# Patient Record
Sex: MALE | Race: WHITE
[De-identification: names, ages, dates, MRNs, and addresses within clinical notes are randomized per-mention and may not be internally consistent; named-entity substitution may affect disease eponyms.]

---

## 2017-01-27 NOTE — UC
Throat Pain/Nasal Shine HPI





- HPI Summary


HPI Summary: 





SORE THROAT COUGH, MUSCLE ACHES, CONGESTION FOR ONE DAY.





- History of Current Complaint


Chief Complaint: UCRespiratory


Stated Complaint: THROAT/CHEST COMPLAINTS


Time Seen by Provider: 01/27/17 12:57


Hx Obtained From: Patient


Onset/Duration: Sudden Onset, Lasting Hours, Still Present


Severity: Moderate


Cough: Nonproductive


Associated Signs & Symptoms: Positive: Dysphagia, Hoarseness, Fever





- Epiglottits Risk Factors


Epiglottis Risk Factors: Negative





- Allergies/Home Medications


Allergies/Adverse Reactions: 


 Allergies











Allergy/AdvReac Type Severity Reaction Status Date / Time


 


Amoxicillin Allergy Severe Hives Unverified 08/26/14 15:44


 


Penicillins Allergy  Hives Verified 12/28/15 07:40











Home Medications: 


 Home Medications





Propanolol 10 mg PO BID PRN 01/27/17 [History Confirmed 01/27/17]


Zolpidem TAB* [Ambien*] 10 mg BEDTIME 01/27/17 [History Confirmed 01/27/17]











PMH/Surg Hx/FS Hx/Imm Hx


Previously Healthy: Yes


Endocrine History Of: 


   Denies: Diabetes, Thyroid Disease


Cardiovascular History Of: 


   Denies: Cardiac Disorders, Hypertension, Pacemaker/ICD, Myocardial Infarction

, Congestive Heart Failure, Atrial Fibrillation, Deep Vein Thrombosis, Bleeding 

Disorders


Respiratory History Of: 


   Denies: COPD, Asthma, Bronchitis, Pneumonia, Pulmonary Embolism


GI/ History Of: 


   Denies: Gastroesophageal Reflux, Ulcer, Gastrointestinal Bleed, Gall Bladder 

Disease, Kidney Stones, Diverticulitis, Renal Disease, Urosepsis


Neurological History Of: 


   Denies: TIA, CVA, Dementia, Seizures, Migraine


Psychological History Of: 


   Denies: Anxiety, Depression, Bipolar Disorder, Schizophrenia, Post Traumatic 

Stress Disorder


Cancer History Of: 


   Denies: Lung Cancer, Colorectal Cancer, Prostate Cancer


Other History Of: 


   Negative For: HIV, Hepatitis B, Hepatitis C





- Surgical History


Surgical History: Yes


Surgery Procedure, Year, and Place: age [age] baby teeth removed





- Family History


Known Family History: 


   Negative: Seizure Disorder, Blood Disorder





- Social History


Occupation: Unemployed


Lives: With Family


Alcohol Use: None


Substance Use Type: None


Smoking Status (MU): Light Every Day Tobacco Smoker


Type: Cigarettes


Amount Used/How Often: 24cigs per day


Length of Time of Smoking/Using Tobacco: 1 year


Have You Smoked in the Last Year: Yes


Household Exposure Type: Cigarettes


Cessation Counseling: Counseled 3+Min - 10 Min





Review of Systems


Constitutional: Fever, Chills


Skin: Negative


Eyes: Negative


ENT: Sore Throat


Respiratory: Cough


Cardiovascular: Negative


Gastrointestinal: Negative


Genitourinary: Negative


Motor: Negative


Neurovascular: Negative


Musculoskeletal: Negative


Neurological: Negative


Psychological: Negative


All Other Systems Reviewed And Are Negative: Yes





Physical Exam


Triage Information Reviewed: Yes


Appearance: No Pain Distress, Well-Nourished, Ill-Appearing - MILD


Vital Signs: 


 Initial Vital Signs











Temp  98.7 F   01/27/17 12:48


 


Pulse  65   01/27/17 12:48


 


Resp  18   01/27/17 12:48


 


BP  122/60   01/27/17 12:48


 


Pulse Ox  97   01/27/17 12:48











Vital Signs Reviewed: Yes


Eye Exam: Normal


Eyes: Positive: Conjunctiva Clear


ENT: Positive: Hearing grossly normal, Pharyngeal erythema, TMs normal, 

Tonsillar swelling


Dental Exam: Normal


Neck exam: Normal


Neck: Positive: Supple, Nontender, No Lymphadenopathy


Respiratory Exam: Normal


Respiratory: Positive: Chest non-tender, Lungs clear, Normal breath sounds, No 

respiratory distress, No accessory muscle use


Cardiovascular Exam: Normal


Cardiovascular: Positive: RRR, No Murmur, Pulses Normal


Abdominal Exam: Normal


Abdomen Description: Positive: Nontender, No Organomegaly


Musculoskeletal Exam: Normal


Neurological Exam: Normal


Psychological Exam: Normal


Skin Exam: Normal





Throat Pain/Nasal Course/Dx





- Differential Dx/Diagnosis


Differential Diagnosis/HQI/PQRI: Pharyngitis, Sinusitis, Tonsillitis


Provider Diagnoses: TONSILLITIS.  VIRAL SYNDROME





Discharge





- Discharge Plan


Condition: Stable


Disposition: HOME


Patient Education Materials:  Tonsillitis (ED), Viral Syndrome (ED)


Referrals: 


Bob Saul MD [Primary Care Provider] -

## 2018-01-21 NOTE — UC
Adrien IRIZARRY Stephanie, scribed for Shivam Pearce MD on 01/21/18 at 1551 .





Throat Pain/Nasal Shine HPI





- HPI Summary


HPI Summary: 


The pt is a 24 y/o M presenting to  with c/o nasal congestion that began 2 

weeks ago. Symptoms include productive cough with green sputum. The pt denies 

wheezing and ear pain. The pt reports treating with Dayquil and Sudafed. Pt 

denies recurring history of sinus infections. 











- History of Current Complaint


Chief Complaint: UCRespiratory


Stated Complaint: COUGH CONGESTION


Time Seen by Provider: 01/21/18 15:02


Hx Obtained From: Patient


Onset/Duration: Lasting Weeks - 2, Still Present


Severity: Moderate


Pain Intensity: 6


Pain Scale Used: 0-10 Numeric


Cough: Sputum Appears - green


Associated Signs & Symptoms: Positive: Other - nasal congestion, productive 

cough with green sputum





- Allergies/Home Medications


Allergies/Adverse Reactions: 


 Allergies











Allergy/AdvReac Type Severity Reaction Status Date / Time


 


Amoxicillin Allergy Severe Hives Unverified 01/21/18 13:28


 


Penicillins Allergy  Hives Verified 01/21/18 13:28














PMH/Surg Hx/FS Hx/Imm Hx


Previously Healthy: Yes - Pt denies any medical history. 


Other History Of: 


   Negative For: HIV, Hepatitis B, Hepatitis C





- Surgical History


Surgical History: Yes


Surgery Procedure, Year, and Place: age [age] baby teeth removed





- Family History


Known Family History: Positive: Unknown - Pt denies all family history.


   Negative: Seizure Disorder, Blood Disorder





- Social History


Occupation: Unemployed


Lives: With Family


Alcohol Use: None


Substance Use Type: None


Smoking Status (MU): Light Every Day Tobacco Smoker


Type: Cigarettes


Amount Used/How Often: 10cigs per day


Length of Time of Smoking/Using Tobacco: 1 year


Have You Smoked in the Last Year: Yes


Household Exposure Type: Cigarettes





- Immunization History


Most Recent Influenza Vaccination: unknown





Review of Systems


Constitutional: Negative


Skin: Negative


Eyes: Negative


ENT: Other - nasal congestion


Respiratory: Cough - productive with green sputum.


Cardiovascular: Negative


Gastrointestinal: Negative


Genitourinary: Negative


Motor: Negative


Neurovascular: Negative


Musculoskeletal: Negative


Neurological: Negative


Psychological: Negative


All Other Systems Reviewed And Are Negative: Yes





Physical Exam


Triage Information Reviewed: Yes


Vital Signs: 


 Initial Vital Signs











Temp  98.7 F   01/21/18 13:24


 


Pulse  82   01/21/18 13:24


 


Resp  16   01/21/18 13:24


 


BP  131/69   01/21/18 13:24


 


Pulse Ox  98   01/21/18 13:24











Vital Signs Reviewed: Yes





- Additional Comments


General: well-appearing, no pain distress


Skin: warm, color reflects adequate perfusion, dry


Head: normal


Eyes: EOMI, VARSHA


ENT: tonsils 2+ without exudate


Neck: supple, nontender


Respiratory: CTA, breath sounds present


Cardiovascular: RRR


Abdomen: soft, nontender


Bowel: present


Musculoskeletal: normal, strength/ROM intact


Neurological: normal, sensory/motor intact, A&O x3


Psychological: affect/mood appropriate











Throat Pain/Nasal Course/Dx





- Course


Course Of Treatment: Medications reviewed.





- Differential Dx/Diagnosis


Provider Diagnoses: SINUSITIS





Discharge





- Discharge Plan


Condition: Stable


Disposition: HOME


Prescriptions: 


DOXYcycline CAP(*) [DOXYcycline 100MG CAP(*)] 100 mg PO BID #20 cap


Patient Education Materials:  Sinusitis (ED)


Referrals: 


[facility] [place] PHYSICIAN REFERRAL [Outside]


No Primary Care Phys,NOPCP [Primary Care Provider] - 


Additional Instructions: 


FOLLOW UP WITH YOUR DOCTOR.


GET RECHECKED FOR ANY WORSENING OF YOUR CONDITION OR QUESTIONS OR CONCERNS.





The documentation as recorded by the Adrien linder Stephanie accurately reflects 

the service I personally performed and the decisions made by me, Shivam Pearce MD.
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
## 2019-02-01 ENCOUNTER — HOSPITAL ENCOUNTER (EMERGENCY)
Dept: HOSPITAL 25 - UCEAST | Age: 24
Discharge: HOME | End: 2019-02-01
Payer: COMMERCIAL

## 2019-02-01 VITALS — DIASTOLIC BLOOD PRESSURE: 69 MMHG | SYSTOLIC BLOOD PRESSURE: 145 MMHG

## 2019-02-01 DIAGNOSIS — J45.901: Primary | ICD-10-CM

## 2019-02-01 DIAGNOSIS — F17.210: ICD-10-CM

## 2019-02-01 DIAGNOSIS — R03.0: ICD-10-CM

## 2019-02-01 DIAGNOSIS — Z88.0: ICD-10-CM

## 2019-02-01 LAB
FLUAV RNA SPEC QL NAA+PROBE: NEGATIVE
FLUBV RNA SPEC QL NAA+PROBE: NEGATIVE

## 2019-02-01 PROCEDURE — 99212 OFFICE O/P EST SF 10 MIN: CPT

## 2019-02-01 PROCEDURE — 71046 X-RAY EXAM CHEST 2 VIEWS: CPT

## 2019-02-01 PROCEDURE — G0463 HOSPITAL OUTPT CLINIC VISIT: HCPCS
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
## 2019-03-24 ENCOUNTER — HOSPITAL ENCOUNTER (EMERGENCY)
Dept: HOSPITAL 25 - ED | Age: 24
Discharge: HOME | End: 2019-03-24
Payer: MEDICAID

## 2019-03-24 VITALS — SYSTOLIC BLOOD PRESSURE: 118 MMHG | DIASTOLIC BLOOD PRESSURE: 59 MMHG

## 2019-03-24 DIAGNOSIS — R11.10: Primary | ICD-10-CM

## 2019-03-24 DIAGNOSIS — Z88.0: ICD-10-CM

## 2019-03-24 DIAGNOSIS — Z87.891: ICD-10-CM

## 2019-03-24 LAB
ALBUMIN SERPL BCG-MCNC: 4.7 G/DL (ref 3.2–5.2)
ALBUMIN/GLOB SERPL: 1.9 {RATIO} (ref 1–3)
ALP SERPL-CCNC: 68 U/L (ref 34–104)
ALT SERPL W P-5'-P-CCNC: 13 U/L (ref 7–52)
ANION GAP SERPL CALC-SCNC: 6 MMOL/L (ref 2–11)
AST SERPL-CCNC: 16 U/L (ref 13–39)
BASOPHILS # BLD AUTO: 0 10^3/UL (ref 0–0.2)
BUN SERPL-MCNC: 13 MG/DL (ref 6–24)
BUN/CREAT SERPL: 15.7 (ref 8–20)
CALCIUM SERPL-MCNC: 9.2 MG/DL (ref 8.6–10.3)
CHLORIDE SERPL-SCNC: 110 MMOL/L (ref 101–111)
EOSINOPHIL # BLD AUTO: 0 10^3/UL (ref 0–0.6)
GLOBULIN SER CALC-MCNC: 2.5 G/DL (ref 2–4)
GLUCOSE SERPL-MCNC: 145 MG/DL (ref 70–100)
HCO3 SERPL-SCNC: 24 MMOL/L (ref 22–32)
HCT VFR BLD AUTO: 49 % (ref 36–46)
HGB BLD-MCNC: 16.7 G/DL (ref 14–18)
LYMPHOCYTES # BLD AUTO: 0.4 10^3/UL (ref 1–4.8)
MCH RBC QN AUTO: 30 PG (ref 27–31)
MCHC RBC AUTO-ENTMCNC: 34 G/DL (ref 31–36)
MCV RBC AUTO: 87 FL (ref 80–94)
MONOCYTES # BLD AUTO: 0.9 10^3/UL (ref 0–0.8)
NEUTROPHILS # BLD AUTO: 13.1 10^3/UL (ref 1.5–7.7)
NRBC # BLD AUTO: 0 10^3/UL
NRBC BLD QL AUTO: 0.2
PLATELET # BLD AUTO: 171 10^3/UL (ref 150–450)
POTASSIUM SERPL-SCNC: 3.7 MMOL/L (ref 3.5–5)
PROT SERPL-MCNC: 7.2 G/DL (ref 6.4–8.9)
RBC # BLD AUTO: 5.58 10^6 /UL (ref 4.18–5.48)
SODIUM SERPL-SCNC: 140 MMOL/L (ref 135–145)
WBC # BLD AUTO: 14.5 10^3/UL (ref 3.5–10.8)

## 2019-03-24 PROCEDURE — 99283 EMERGENCY DEPT VISIT LOW MDM: CPT

## 2019-03-24 PROCEDURE — 36415 COLL VENOUS BLD VENIPUNCTURE: CPT

## 2019-03-24 PROCEDURE — 85025 COMPLETE CBC W/AUTO DIFF WBC: CPT

## 2019-03-24 PROCEDURE — 96374 THER/PROPH/DIAG INJ IV PUSH: CPT

## 2019-03-24 PROCEDURE — 80053 COMPREHEN METABOLIC PANEL: CPT

## 2019-03-24 PROCEDURE — 96361 HYDRATE IV INFUSION ADD-ON: CPT

## 2019-03-24 NOTE — XMS REPORT
Continuity of Care Document (CCD)

 Created on:2019



Patient:Conor Li

Sex:Male

:1995

External Reference #:[id number]





Demographics







 Address  [address]



   [place] [address]

 

 Mobile Phone  [phone]

 

 Email Address  [email or web address]

 

 Preferred Language  en

 

 Marital Status  Not  or 

 

 Holiness Affiliation  Unknown

 

 Race  White

 

 Ethnic Group  Not  or 









Author







 Name  Corin Logan









Support







 Name  Relationship  Address  Phone

 

 Juancarlos Pope  Unavailable  Unavailable  [phone]









Care Team Providers







 Name  Role  Phone

 

 Patient's Choice  Primary Care Physician  Unavailable









Payers







 Date  Identification Numbers  Payment Provider  Subscriber

 

 Expires: 2019  Policy Number: [id number]  Venkata Li









 PayID: [id number]  [address]









 [place] [address]









   Policy Number: [id number]  Medicaid  Conor Li









 Group Name: 1 1  [address]

 

 PayID: [id number]  [place] [address]









   PayID: [id number]  Cont: Kaden Jorge Li









 [address]

 

 [place] [address]









 Expires: 2019  Policy Number: [id number]  Medicaid  Conor Li









 Group Name: 1 1  [address]

 

 PayID: [id number]  [place] [address]







Advance Directives







 Description

 

 No Information Available







Problems







 Description

 

 No Information







Family History







 Description

 

 No Information Available







Social History







 Type  Date  Description  Comments

 

 Birth Sex    Unknown  

 

 Lives With    Spouse  

 

 Occupation    Unemployed  

 

 ETOH Use    Rarely consumes alcohol  

 

 Tobacco Use  Start: Unknown  Patient is a current smoker,  



     smokes every day  

 

 Smoking Status  Reviewed: 19  Patient is a current smoker,  



     smokes every day  

 

 Exercise Type/Frequency    Exercises regularly  







Allergies, Adverse Reactions, Alerts







 Date  Description  Reaction  Status  Severity  Comments

 

 2019  Penicillin    Active    hives







Medications







 Medication  Date  Status  Form  Strength  Qnty  SIG  Indications  Ordering



                 Provider

 

 Tramadol HCL    Active  Tablets  50mg  15tabs  1 tab by    Cecy Worrell M.D.



             every 4-6    



             hours as    



             needed    



             pain    

 

 Ultracet    Active  Tablets  37.5-325mg  15tabs  1 tab by    Cecy Worrell M.D.



             every 4-6    



             hours as    



             needed    



             for pain.    



             For use    



             after    



             surgery    

 

                 

 

 No Active    Hx            Unknown



 Medications  019 -              



                 



   019              







Immunizations







 Description

 

 No Information Available







Vital Signs







 Date  Vital  Result  Comment

 

 2019 10:06am  Height  65 inches  5'5"









 Heart Rate  72 /min  

 

 BP Systolic  108 mmHg  

 

 BP Diastolic  78 mmHg  

 

 Body Temperature  98.9 F  

 

 Pain Level  3  









 2019  9:24am  Height  65 inches  5'5"









 Weight  187.00 lb  

 

 Heart Rate  72 /min  

 

 BP Systolic  118 mmHg  

 

 BP Diastolic  80 mmHg  

 

 Body Temperature  98.2 F  

 

 Pain Level  3  

 

 BMI (Body Mass Index)  31.1 kg/m2  







Results







 Description

 

 No Information Available







Procedures







 Description

 

 No Information Available







Encounters







 Type  Date  Location  Provider  Dx  Diagnosis

 

 Office Visit  2019  Orthopedic  Cecy Worrell,  S61.511A  Laceration



   9:30a  [facility]    without foreign



           body of right



           wrist, init



           encntr







Plan of Treatment

Future Appointment(s):2019 10:15 am - Cecy Worrell M.D. at [facility] 
[facility] - Ashleigh Mancia, [facility]-CS61.511D Laceration without 
foreign body of right wrist, subsequent eFollow up:Follow up:   2 weeks

## 2019-03-24 NOTE — ED
Complex/Multi-Sys Presentation





- HPI Summary


HPI Summary: 





This patient is a 24 year old M presenting to [facility] with a chief complaint of 

vomiting since 19:30 last night after eating "too much" pizza. After multiple 

bouts of vomiting patient reports coffee grounds vomit. Denies abdominal pain

, fever and PMHx of GI issues.





- History Of Current Complaint


Chief Complaint: EDNauseaVomitDiarrh


Hx Obtained From: Patient


Onset/Duration: Lasting Hours


Timing: Constant


Location: Negative


Aggravating Factor(s): nothing


Alleviating Factor(s): nothing





- Allergies/Home Medications


Allergies/Adverse Reactions: 


 Allergies











Allergy/AdvReac Type Severity Reaction Status Date / Time


 


Penicillins Allergy  Hives Verified 03/24/19 03:25














PMH/Surg Hx/FS Hx/Imm Hx


Endocrine/Hematology History: 


   Denies: Hx Diabetes, Hx Thyroid Disease


Cardiovascular History: 


   Denies: Hx Congestive Heart Failure, Hx Deep Vein Thrombosis, Hx Hypertension

, Hx Myocardial Infarction, Hx Pacemaker/ICD, Other Cardiovascular Problems/

Disorders


Respiratory History: Reports: Hx Asthma - as a child, Other Respiratory Problems

/Disorders - current smoker


   Denies: Hx Chronic Obstructive Pulmonary Disease (COPD), Hx Lung Cancer, Hx 

Pneumonia, Hx Pulmonary Embolism


GI History: 


   Denies: Hx Gall Bladder Disease, Hx Gastrointestinal Bleed, Hx Ulcer, Hx 

Urosepsis, Other GI Disorders


 History: 


   Denies: Hx Kidney Stones, Hx Renal Disease, Other  Problems/Disorders


Musculoskeletal History: Reports: Other Musculoskeletal History - Left clavicle 

ORIF, Right humerus ORIF


   Denies: Hx Scoliosis


Sensory History: Reports: Hx Contacts or Glasses - Glasses


   Denies: Hx Hearing Aid


Opthamlomology History: Reports: Hx Contacts or Glasses - Glasses


Neurological History: Reports: Hx Nerve Disease - no feeling in pointer finger 

and thumb of right hand-laceration, Hx Seizures - had seizures as a child, 

stopped at age 7-states was from pain


   Denies: Hx Dementia, Hx Headaches, Hx Migraine, Hx Transient Ischemic 

Attacks (TIA), Other Neuro Impairments/Disorders


Psychiatric History: 


   Denies: Hx Anxiety, Hx Depression, Hx Schizophrenia, Hx Bipolar Disorder





- Surgical History


Surgery Procedure, Year, and Place: age [age] baby teeth removed.  Right humerus 

ORIF  2017 [facility].  Left clavical ORIF  2017 [facility]


Hx Anesthesia Reactions: No





- Immunization History


Date of Tetanus Vaccine: unknown


Infectious Disease History: No


Infectious Disease History: 


   Denies: Hx Hepatitis, Hx Human Immunodeficiency Virus (HIV), History Other 

Infectious Disease, Traveled Outside the [place] in Last 30 Days





- Family History


Known Family History: 


   Negative: Seizure Disorder, Blood Disorder





- Social History


Alcohol Use: None


Hx Substance Use: No


Substance Use Type: Reports: None


Hx Tobacco Use: Yes


Smoking Status (MU): Former Smoker


Type: Cigarettes


Amount Used/How Often: 10 cigs per day - 1 pack every 3 days-  for 4 years


Length of Time of Smoking/Using Tobacco: 1 year


Have You Smoked in the Last Year: Yes





Review of Systems


Negative: Fever


Positive: Vomiting.  Negative: Abdominal Pain


All Other Systems Reviewed And Are Negative: Yes





Physical Exam





- Summary


Physical Exam Summary: 





Appearance: Well-appearing, Well-nourished, lying in bed comfortably


Skin: Warm, dry, no obvious rash


Eyes: sclera anicteric, no conjunctival pallor


ENT: mucous membranes moist, pharynx appears normal


Neck: Supple, nontender


Respiratory: Clear to auscultation, no signs of respiratory distress


Cardiovascular: Normal S1, S2. No murmurs. Normal distal pulses in tibial and 

radial bilaterally.


Abdomen: Soft, nontender, normal active bowel sounds present


Musculoskeletal: Normal, Strength/ROM Intact


Neurological: A&Ox3, awake and alert, mentation is normal, speech is fluent and 

appropriate


Psychiatric: affect is normal, does not appear anxious or depressed





Triage Information Reviewed: Yes


Vital Signs On Initial Exam: 


 Initial Vitals











Temp Pulse Resp BP Pulse Ox


 


 97.1 F   114   17   115/91   97 


 


 03/24/19 03:24  03/24/19 03:24  03/24/19 03:24  03/24/19 03:24  03/24/19 03:24











Vital Signs Reviewed: Yes





Diagnostics





- Vital Signs


 Vital Signs











  Temp Pulse Resp BP Pulse Ox


 


 03/24/19 03:24  97.1 F  114  17  115/91  97














- Laboratory


Result Diagrams: 


 03/24/19 03:56





 03/24/19 03:56


Lab Statement: Any lab studies that have been ordered have been reviewed, and 

results considered in the medical decision making process.





Complex Multi-Symp Course/Dx


Course Of Treatment: 24 year old M presenting with vomiting since 19:30 last 

night . After multiple bouts of vomiting patient reports coffee grounds 

vomit. Denies abdominal pain and fever. Patient is given Zofran for nausea and 

IV fluids.  Patient is able to tolerate PO well and will be discharged home 

with a prescription for Zofran. Patient is instructed to have clear liquid diet 

for a day before returning to a normal diet.





- Diagnoses


Provider Diagnoses: 


 Acute vomiting








Discharge





- Sign-Out/Discharge


Documenting (check all that apply): Patient Departure - discharge


Patient Received Moderate/Deep Sedation with Procedure: No





- Discharge Plan


Condition: Improved


Disposition: HOME


Prescriptions: 


Ondansetron ODT TAB* [Zofran 4 MG Odt TAB*] 8 mg PO Q6H PRN #14 tab.odt


 PRN Reason: Nausea/Vomiting


Patient Education Materials:  Clear Liquid Diet (ED), Acute Nausea and Vomiting 

(ED)


Referrals: 


[facility] [Outside] - 2 Days (if no better)


Additional Instructions: 


Stick to clear liquid diet today. If you are feeling better Monday you can 

resume a normal diet.





- Billing Disposition and Condition


Condition: IMPROVED


Disposition: Home





- Attestation Statements


Document Initiated by Thade: Yes


Documenting Scribe: Kimberly Doyle


Provider For Whom Dusty is Documenting (Include Credential): Jae Lai MD


Scribe Attestation: 


IKimberly, scribed for Jae Lai MD on 03/27/19 at 1840. 


Scribe Documentation Reviewed: Yes


Provider Attestation: 


The documentation as recorded by the Kimberly linder accurately reflects 

the service I personally performed and the decisions made by Jae perrin MD


Status of Scribe Document: Viewed

## 2020-01-18 ENCOUNTER — HOSPITAL ENCOUNTER (EMERGENCY)
Dept: HOSPITAL 25 - ED | Age: 25
Discharge: HOME | End: 2020-01-18
Payer: SELF-PAY

## 2020-01-18 VITALS — SYSTOLIC BLOOD PRESSURE: 120 MMHG | DIASTOLIC BLOOD PRESSURE: 67 MMHG

## 2020-01-18 DIAGNOSIS — J02.0: Primary | ICD-10-CM

## 2020-01-18 DIAGNOSIS — Z88.0: ICD-10-CM

## 2020-01-18 DIAGNOSIS — Z87.891: ICD-10-CM

## 2020-01-18 LAB — S PYO RRNA THROAT QL PROBE: POSITIVE

## 2020-01-18 PROCEDURE — 99282 EMERGENCY DEPT VISIT SF MDM: CPT

## 2020-01-18 PROCEDURE — 87651 STREP A DNA AMP PROBE: CPT
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
